# Patient Record
Sex: MALE | Race: WHITE | NOT HISPANIC OR LATINO | URBAN - METROPOLITAN AREA
[De-identification: names, ages, dates, MRNs, and addresses within clinical notes are randomized per-mention and may not be internally consistent; named-entity substitution may affect disease eponyms.]

---

## 2019-10-14 ENCOUNTER — APPOINTMENT (RX ONLY)
Dept: URBAN - METROPOLITAN AREA CLINIC 139 | Facility: CLINIC | Age: 34
Setting detail: DERMATOLOGY
End: 2019-10-14

## 2019-10-14 DIAGNOSIS — F17.200 NICOTINE DEPENDENCE, UNSPECIFIED, UNCOMPLICATED: ICD-10-CM

## 2019-10-14 DIAGNOSIS — B35.6 TINEA CRURIS: ICD-10-CM

## 2019-10-14 PROCEDURE — ? KOH PREP

## 2019-10-14 PROCEDURE — ? COUNSELING

## 2019-10-14 PROCEDURE — 87220 TISSUE EXAM FOR FUNGI: CPT

## 2019-10-14 PROCEDURE — 99202 OFFICE O/P NEW SF 15 MIN: CPT

## 2019-10-14 PROCEDURE — ? PRESCRIPTION

## 2019-10-14 RX ORDER — TERBINAFINE HYDROCHLORIDE 250 MG/1
1 TABLET ORAL QD
Qty: 21 | Refills: 0 | Status: ERX | COMMUNITY
Start: 2019-10-14

## 2019-10-14 RX ORDER — KETOCONAZOLE 20 MG/G
1 CREAM TOPICAL BID
Qty: 1 | Refills: 5 | Status: ERX | COMMUNITY
Start: 2019-10-14

## 2019-10-14 RX ADMIN — TERBINAFINE HYDROCHLORIDE 1: 250 TABLET ORAL at 00:00

## 2019-10-14 RX ADMIN — KETOCONAZOLE 1: 20 CREAM TOPICAL at 00:00

## 2019-10-14 ASSESSMENT — LOCATION SIMPLE DESCRIPTION DERM: LOCATION SIMPLE: GROIN

## 2019-10-14 ASSESSMENT — LOCATION DETAILED DESCRIPTION DERM: LOCATION DETAILED: SUPRAPUBIC SKIN

## 2019-10-14 ASSESSMENT — SEVERITY ASSESSMENT: SEVERITY: MILD

## 2019-10-14 ASSESSMENT — LOCATION ZONE DERM: LOCATION ZONE: TRUNK

## 2019-10-14 NOTE — HPI: RASH
What Type Of Note Output Would You Prefer (Optional)?: Bullet Format
How Severe Is Your Rash?: moderate
Is This A New Presentation, Or A Follow-Up?: Rash
Additional History: Patient states that the rash only seems to occur after he is working out. Notes his father had either psoriasis or eczema. (Pt unsure)

## 2019-10-14 NOTE — PROCEDURE: KOH PREP
Showing: branching hyphae
Detail Level: Zone
Koh Intro Text (From The.....): A KOH prep was ordered and evaluated from the
Koh Procedure Text (Tissue Harvesting Technique): A 15-blade scalpel was used to scrape the skin. The skin scrapings were placed on a glass slide, covered with a coverslip and a KOH solution was applied.